# Patient Record
Sex: FEMALE | Race: BLACK OR AFRICAN AMERICAN | NOT HISPANIC OR LATINO | Employment: OTHER | URBAN - METROPOLITAN AREA
[De-identification: names, ages, dates, MRNs, and addresses within clinical notes are randomized per-mention and may not be internally consistent; named-entity substitution may affect disease eponyms.]

---

## 2017-01-09 ENCOUNTER — OFFICE VISIT (OUTPATIENT)
Dept: OPHTHALMOLOGY | Facility: CLINIC | Age: 82
End: 2017-01-09
Payer: MEDICARE

## 2017-01-09 DIAGNOSIS — H02.139 SENILE ECTROPION, UNSPECIFIED LATERALITY: ICD-10-CM

## 2017-01-09 DIAGNOSIS — H01.003 BLEPHARITIS OF BOTH EYES, UNSPECIFIED EYELID, UNSPECIFIED TYPE: Primary | ICD-10-CM

## 2017-01-09 DIAGNOSIS — H04.123 INSUFFICIENCY OF TEAR FILM OF BOTH EYES: ICD-10-CM

## 2017-01-09 DIAGNOSIS — H04.123 DRY EYE SYNDROME, BILATERAL: ICD-10-CM

## 2017-01-09 DIAGNOSIS — H40.1133 PRIMARY OPEN ANGLE GLAUCOMA OF BOTH EYES, SEVERE STAGE: Primary | ICD-10-CM

## 2017-01-09 DIAGNOSIS — H01.006 BLEPHARITIS OF BOTH EYES, UNSPECIFIED EYELID, UNSPECIFIED TYPE: Primary | ICD-10-CM

## 2017-01-09 DIAGNOSIS — Z96.1 PSEUDOPHAKIA: ICD-10-CM

## 2017-01-09 PROBLEM — H02.109: Status: ACTIVE | Noted: 2017-01-09

## 2017-01-09 PROCEDURE — 99999 PR PBB SHADOW E&M-EST. PATIENT-LVL II: CPT | Mod: PBBFAC,,, | Performed by: OPHTHALMOLOGY

## 2017-01-09 PROCEDURE — 99212 OFFICE O/P EST SF 10 MIN: CPT | Mod: PBBFAC | Performed by: OPHTHALMOLOGY

## 2017-01-09 PROCEDURE — 92014 COMPRE OPH EXAM EST PT 1/>: CPT | Mod: GW,S$PBB,, | Performed by: OPHTHALMOLOGY

## 2017-01-09 RX ORDER — TEMAZEPAM 15 MG/1
CAPSULE ORAL
Refills: 0 | COMMUNITY
Start: 2017-01-05

## 2017-01-09 RX ORDER — ERYTHROMYCIN 5 MG/G
1 OINTMENT OPHTHALMIC NIGHTLY
Qty: 1 G | Refills: 4 | Status: SHIPPED | OUTPATIENT
Start: 2017-01-09

## 2017-01-09 RX ORDER — HYDROCODONE BITARTRATE AND ACETAMINOPHEN 5; 325 MG/1; MG/1
1 TABLET ORAL 3 TIMES DAILY PRN
Refills: 0 | COMMUNITY
Start: 2017-01-06

## 2017-01-09 RX ORDER — METRONIDAZOLE 500 MG/1
500 TABLET ORAL 2 TIMES DAILY
Refills: 0 | COMMUNITY
Start: 2016-10-14 | End: 2017-02-03

## 2017-01-09 RX ORDER — CEPHALEXIN 250 MG/1
CAPSULE ORAL
Refills: 0 | COMMUNITY
Start: 2016-10-14 | End: 2017-02-03 | Stop reason: ALTCHOICE

## 2017-01-09 RX ORDER — ERYTHROMYCIN 5 MG/G
1 OINTMENT OPHTHALMIC NIGHTLY
COMMUNITY
End: 2017-01-09 | Stop reason: SDUPTHER

## 2017-01-09 RX ORDER — CIPROFLOXACIN 500 MG/1
TABLET ORAL
Refills: 0 | COMMUNITY
Start: 2016-11-22 | End: 2017-02-03 | Stop reason: ALTCHOICE

## 2017-01-09 RX ORDER — QUETIAPINE FUMARATE 25 MG/1
TABLET, FILM COATED ORAL
Refills: 0 | COMMUNITY
Start: 2017-01-05

## 2017-01-09 RX ORDER — POTASSIUM CHLORIDE 750 MG/1
10 TABLET, EXTENDED RELEASE ORAL DAILY
Refills: 0 | COMMUNITY
Start: 2016-12-23 | End: 2017-02-03

## 2017-01-09 RX ORDER — FUROSEMIDE 40 MG/1
40 TABLET ORAL DAILY
Refills: 0 | COMMUNITY
Start: 2016-12-23 | End: 2017-02-03 | Stop reason: SDUPTHER

## 2017-01-09 RX ORDER — DOCUSATE SODIUM 50MG AND SENNOSIDES 8.6MG 8.6; 5 MG/1; MG/1
TABLET, FILM COATED ORAL
Refills: 0 | COMMUNITY
Start: 2016-12-05 | End: 2017-02-03

## 2017-01-09 NOTE — PROGRESS NOTES
HPI     Concerns About Ocular Health    Additional comments: 1 yr chk           Comments   Patient states that vision seems about the same as last visit in both   eyes.       Last edited by Piter Parekh on 1/9/2017  9:55 AM. (History)            Assessment /Plan     For exam results, see Encounter Report.    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  Also discussed options utilizing medications.    Refresh PM  Genteal Gel        ACIOL OD  PCIOL OS  quiet       Plan  Family wishes to fu with LV   RTC 6 months IOP & DFE--> consider restarting South q day  Sooner prn with family understanding

## 2017-01-09 NOTE — MR AVS SNAPSHOT
[facility] - Ophthalmology  [address]  [facility] [address]  Phone: [phone]  Fax: [phone]                  Veronika Cabrera   2017 9:30 AM   Office Visit    Description:  Female : 1920   Provider:  Deepak Herndon MD   Department:  [facility] - Ophthalmology           Reason for Visit     Concerns About Ocular Health           Diagnoses this Visit        Comments    Primary open angle glaucoma of both eyes, severe stage    -  Primary     Pseudophakia         Insufficiency of tear film of both eyes         Senile ectropion, unspecified laterality         Dry eye syndrome, bilateral                To Do List           Future Appointments        Provider Department Dept Phone    2017 2:00 PM Alessio Staples, OD [place] - [facility] [phone]    2017 3:00 PM Alessio Staples, OD [place] [facility] [phone]      Goals (5 Years of Data)     None      Follow-Up and Disposition     Return in about 6 months (around 2017), or if symptoms worsen or fail to improve, for Pressure check.      [facility] On Call     [facility] On Call Nurse Care Line -  Assistance  Registered nurses in the [facility] On Call Center provide clinical advisement, health education, appointment booking, and other advisory services.  Call for this free service at [phone].             Medications           Message regarding Medications     Verify the changes and/or additions to your medication regime listed below are the same as discussed with your clinician today.  If any of these changes or additions are incorrect, please notify your healthcare provider.             Verify that the below list of medications is an accurate representation of the medications you are currently taking.  If none reported, the list may be blank. If incorrect, please contact your healthcare provider. Carry this list with you in case of emergency.           Current Medications     acetaminophen (TYLENOL)  325 MG tablet Take 2 tablets (650 mg total) by mouth every 6 (six) hours as needed.    atorvastatin (LIPITOR) 40 MG tablet TAKE 1 TABLET BY MOUTH EVERY DAY.    cephALEXin (KEFLEX) 250 MG capsule TAKE 1 TABLET BY MOUTH TWICE DAILY FOR 7 day    ciprofloxacin HCl (CIPRO) 500 MG tablet TAKE 1 TABLET BY MOUTH EVERY DAY FOR 10 days    furosemide (LASIX) 20 MG tablet TAKE 2 TABLETS BY MOUTH EVERY MORNING AND 1 TABLET BY MOUTH IN THE MIDDLE OF THE DAY    furosemide (LASIX) 20 MG tablet TAKE 2 TABLETS BY MOUTH EVERY MORNING AND 1 TABLET IN THE MIDDLE OF THE DAY    furosemide (LASIX) 40 MG tablet Take 40 mg by mouth once daily.    hydrALAZINE (APRESOLINE) 25 MG tablet TAKE 1 TABLET BY MOUTH TWICE DAILY    hydrocodone-acetaminophen 5-325mg (NORCO) 5-325 mg per tablet Take 1 tablet by mouth 3 (three) times daily as needed.    lisinopril (PRINIVIL,ZESTRIL) 40 MG tablet TAKE 1 TABLET BY MOUTH EVERY DAY    metronidazole (FLAGYL) 500 MG tablet Take 500 mg by mouth 2 (two) times daily.    neomycin-bacitracin-polymyxin (POLYSPORIN) ophthalmic ointment Place into both eyes 4 (four) times daily.     potassium chloride (KLOR-CON) 10 MEQ TbSR Take 10 mEq by mouth once daily.    quetiapine (SEROQUEL) 25 MG Tab TAKE 1 TABLET BY MOUTH EVERY 8 HOURS AS NEEDED FOR AGITATION    SENEXON-S 8.6-50 mg per tablet TAKE 1 TABLET BY MOUTH EVERY 6 HOURS as needed FOR CONSTIPATION    temazepam (RESTORIL) 15 mg Cap TAKE 1 CAPSULE BY MOUTH AT BEDTIME AS NEEDED FOR sleep    erythromycin (ROMYCIN) ophthalmic ointment Place 1 inch into both eyes every evening. Use the first week of every month.           Clinical Reference Information           Allergies as of 1/9/2017     No Known Allergies      Immunizations Administered on Date of Encounter - 1/9/2017     None

## 2017-02-03 ENCOUNTER — INITIAL CONSULT (OUTPATIENT)
Dept: OPTOMETRY | Facility: CLINIC | Age: 82
End: 2017-02-03
Payer: MEDICARE

## 2017-02-03 ENCOUNTER — OFFICE VISIT (OUTPATIENT)
Dept: OPTOMETRY | Facility: CLINIC | Age: 82
End: 2017-02-03
Payer: MEDICARE

## 2017-02-03 DIAGNOSIS — H52.13 MYOPIA WITH ASTIGMATISM AND PRESBYOPIA, BILATERAL: Primary | ICD-10-CM

## 2017-02-03 DIAGNOSIS — H52.4 MYOPIA WITH ASTIGMATISM AND PRESBYOPIA, BILATERAL: Primary | ICD-10-CM

## 2017-02-03 DIAGNOSIS — H52.203 MYOPIA WITH ASTIGMATISM AND PRESBYOPIA, BILATERAL: Primary | ICD-10-CM

## 2017-02-03 PROCEDURE — 99999 PR PBB SHADOW E&M-EST. PATIENT-LVL II: CPT | Mod: PBBFAC,,, | Performed by: OPTOMETRIST

## 2017-02-03 PROCEDURE — 92015 DETERMINE REFRACTIVE STATE: CPT | Mod: ,,, | Performed by: OPTOMETRIST

## 2017-02-03 PROCEDURE — 99499 UNLISTED E&M SERVICE: CPT | Mod: S$PBB,,, | Performed by: OPTOMETRIST

## 2017-02-03 PROCEDURE — 99212 OFFICE O/P EST SF 10 MIN: CPT | Mod: PBBFAC,27,PO | Performed by: OPTOMETRIST

## 2017-02-03 NOTE — LETTER
February 3, 2017      Deepak Herndon MD  [address] [facility] [address]           [place] - Optometry  [address] [facility] [address] [facility]  [place] [address]  Phone: [phone]  Fax: [phone]          Patient: Veronika Cabrera   MR Number: [id number]   YOB: 1920   Date of Visit: 2/3/2017       Dear Dr. Deepak Herndon:    Thank you for referring Veronika Cabrera to me for evaluation. Attached you will find relevant portions of my assessment and plan of care.    If you have questions, please do not hesitate to call me. I look forward to following Veronika Cabrera along with you.    Sincerely,    Alessio Staples, OD    Enclosure  CC:  No Recipients    If you would like to receive this communication electronically, please contact [email or web address] or [phone] to request more information on [email or web address] Link access.    For providers and/or their staff who would like to refer a patient to [facility], please contact us through our one-stop-shop provider referral line, [facility], at [phone].    If you feel you have received this communication in error or would no longer like to receive these types of communications, please e-mail [email or web address]

## 2017-02-03 NOTE — PROGRESS NOTES
Assessment /Plan     For exam results, see Encounter Report.    Myopia with astigmatism and presbyopia, bilateral      1. See other exam low vision.

## 2017-02-03 NOTE — PROGRESS NOTES
HPI     Low Vision    Additional comments: Consult per Dr Herndon           Comments   DLS: 1/9/17 -   Pt mainly watches TV, doesn't always wear glasses  No reading, may look at pics  EES syd 1 x weekly x 1 month OU  Systane BID OU  Refresh pm QHS OU    1. POAG (primary open-angle glaucoma) - Both Eyes     2. Pseudophakia - Both Eyes   3. Ectropion - artificial tears prn   4. ACIOL OD & PCIOL OS - quiet          Last edited by Alessio Staples, OD on 2/3/2017 11:04 AM. (History)        ROS     Negative for: Constitutional, Gastrointestinal, Neurological, Skin,   Genitourinary, Musculoskeletal, HENT, Endocrine, Cardiovascular, Eyes,   Respiratory, Psychiatric, Allergic/Imm, Heme/Lymph    Last edited by Alessio Staples, OD on 2/3/2017  9:49 AM. (History)        Assessment /Plan     For exam results, see Encounter Report.    Myopia with astigmatism and presbyopia, bilateral      1. Spec Rx given. Different lens options discussed with patient. RTC 1 year refraction.

## 2017-02-03 NOTE — LETTER
February 3, 2017      Deepak Herndon MD  [address] [facility] [address]           [place] - Optometry  [address] [address]  [place] [address]  Phone: [phone]  Fax: [phone]          Patient: Veronika Cabrera   MR Number: [id number]   YOB: 1920   Date of Visit: 2/3/2017       Dear Dr. Deepak Herndon:    Thank you for referring Veronika Cabrera to me for evaluation. Attached you will find relevant portions of my assessment and plan of care.    If you have questions, please do not hesitate to call me. I look forward to following Veronika Cabrera along with you.    Sincerely,    Alessio Staples, OD    Enclosure  CC:  No Recipients    If you would like to receive this communication electronically, please contact [email or web address] or [phone] to request more information on [employer] Link access.    For providers and/or their staff who would like to refer a patient to [facility], please contact us through our one-stop-shop provider referral line, [facility], at [phone].    If you feel you have received this communication in error or would no longer like to receive these types of communications, please e-mail [email or web address]

## 2017-08-24 ENCOUNTER — HOSPITAL ENCOUNTER (EMERGENCY)
Facility: OTHER | Age: 82
Discharge: HOSPICE | End: 2017-08-24
Attending: EMERGENCY MEDICINE
Payer: MEDICARE

## 2017-08-24 VITALS
DIASTOLIC BLOOD PRESSURE: 95 MMHG | OXYGEN SATURATION: 97 % | TEMPERATURE: 99 F | HEART RATE: 76 BPM | RESPIRATION RATE: 20 BRPM | SYSTOLIC BLOOD PRESSURE: 179 MMHG

## 2017-08-24 DIAGNOSIS — R40.4 TRANSIENT ALTERATION OF AWARENESS: Primary | ICD-10-CM

## 2017-08-24 DIAGNOSIS — R05.9 COUGH: ICD-10-CM

## 2017-08-24 DIAGNOSIS — Z51.5 HOSPICE CARE PATIENT: ICD-10-CM

## 2017-08-24 LAB
ALBUMIN SERPL BCP-MCNC: 3.2 G/DL
ALP SERPL-CCNC: 264 U/L
ALT SERPL W/O P-5'-P-CCNC: 18 U/L
ANION GAP SERPL CALC-SCNC: 12 MMOL/L
AST SERPL-CCNC: 26 U/L
BASOPHILS # BLD AUTO: 0 K/UL
BASOPHILS NFR BLD: 0 %
BILIRUB SERPL-MCNC: 0.8 MG/DL
BILIRUB UR QL STRIP: NEGATIVE
BNP SERPL-MCNC: 599 PG/ML
BUN SERPL-MCNC: 15 MG/DL
CALCIUM SERPL-MCNC: 9.8 MG/DL
CHLORIDE SERPL-SCNC: 111 MMOL/L
CLARITY UR: CLEAR
CO2 SERPL-SCNC: 25 MMOL/L
COLOR UR: YELLOW
CREAT SERPL-MCNC: 0.9 MG/DL
DIFFERENTIAL METHOD: ABNORMAL
EOSINOPHIL # BLD AUTO: 0 K/UL
EOSINOPHIL NFR BLD: 0.5 %
ERYTHROCYTE [DISTWIDTH] IN BLOOD BY AUTOMATED COUNT: 14.5 %
EST. GFR  (AFRICAN AMERICAN): >60 ML/MIN/1.73 M^2
EST. GFR  (NON AFRICAN AMERICAN): 54 ML/MIN/1.73 M^2
GLUCOSE SERPL-MCNC: 65 MG/DL
GLUCOSE UR QL STRIP: NEGATIVE
HCT VFR BLD AUTO: 44.9 %
HGB BLD-MCNC: 14.8 G/DL
HGB UR QL STRIP: ABNORMAL
KETONES UR QL STRIP: ABNORMAL
LEUKOCYTE ESTERASE UR QL STRIP: NEGATIVE
LYMPHOCYTES # BLD AUTO: 1.2 K/UL
LYMPHOCYTES NFR BLD: 28.2 %
MCH RBC QN AUTO: 29.7 PG
MCHC RBC AUTO-ENTMCNC: 33 G/DL
MCV RBC AUTO: 90 FL
MONOCYTES # BLD AUTO: 0.2 K/UL
MONOCYTES NFR BLD: 4.9 %
NEUTROPHILS # BLD AUTO: 2.7 K/UL
NEUTROPHILS NFR BLD: 66.4 %
NITRITE UR QL STRIP: NEGATIVE
PH UR STRIP: 8 [PH] (ref 5–8)
PLATELET # BLD AUTO: 40 K/UL
PLATELET BLD QL SMEAR: ABNORMAL
PMV BLD AUTO: 11.6 FL
POTASSIUM SERPL-SCNC: 4.1 MMOL/L
PROT SERPL-MCNC: 8 G/DL
PROT UR QL STRIP: NEGATIVE
RBC # BLD AUTO: 4.99 M/UL
SODIUM SERPL-SCNC: 148 MMOL/L
SP GR UR STRIP: 1.01 (ref 1–1.03)
URN SPEC COLLECT METH UR: ABNORMAL
UROBILINOGEN UR STRIP-ACNC: 1 EU/DL
WBC # BLD AUTO: 4.08 K/UL

## 2017-08-24 PROCEDURE — 81003 URINALYSIS AUTO W/O SCOPE: CPT

## 2017-08-24 PROCEDURE — 80053 COMPREHEN METABOLIC PANEL: CPT

## 2017-08-24 PROCEDURE — 85025 COMPLETE CBC W/AUTO DIFF WBC: CPT

## 2017-08-24 PROCEDURE — P9612 CATHETERIZE FOR URINE SPEC: HCPCS

## 2017-08-24 PROCEDURE — 99283 EMERGENCY DEPT VISIT LOW MDM: CPT | Mod: 25

## 2017-08-24 PROCEDURE — 83880 ASSAY OF NATRIURETIC PEPTIDE: CPT

## 2017-08-24 RX ORDER — DOXYCYCLINE 100 MG/1
100 CAPSULE ORAL DAILY
COMMUNITY

## 2017-08-24 NOTE — ED NOTES
Bed: 09  Expected date:   Expected time:   Means of arrival:   Comments:  89 y/o weakness for 2 weeks

## 2017-08-24 NOTE — ED NOTES
Pt to ED via EMS with family c/o AMS x 2 hours. As per her son, the patient was unresponsive this morning and moaning-pt nonverbal at baseline per family but usually nods in response.  EMS reports had a low CBG this morning. Upon arrival to the ED, Pt son reports pt more responsive than she was this morning. Pt with hx of fatigue, tremors x 1 month, decreased appetite x 2 weeks, cough, generalized weakness, bilateral ankle and foot edema. Pt currently drooling which is new per family. Family reports pt took medication this morning. Per pt son, pt with no fever, chills, SOB, CP, N/V/D, or abdominal pain. Pt AAOx4 and appropriate at this time. Respirations even and unlabored. No acute distress noted. Awaiting further orders. Pt updated on POC. Bed is locked and in lowest position with side rails up x2. Call bell within reach and pt oriented to use of call bell. Pt on continuous cardiac monitoring, continuous pulse ox, and continuous BP cuff. Will continue to monitor.

## 2017-08-24 NOTE — ED NOTES
Patient identifiers verified and correct for Veronika Cabrera.    LOC: The patient is awake, alert, the patient is moaning- nonverbal at baseline per family   APPEARANCE: Patient resting comfortably and in no acute distress, patient is clean and well groomed, patient's clothing is properly fastened.  SKIN: The skin is warm and dry, color consistent with ethnicity, patient has normal skin turgor and moist mucus membranes, skin intact, no breakdown or bruising noted, redness noted to bilateral lower eyelids  MUSCULOSKELETAL: Patient moving all extremities spontaneously, no obvious swelling or deformities noted.  RESPIRATORY: Airway is open and patent, respirations are spontaneous, patient has a low effort and rate, no accessory muscle use noted, bilateral breath sounds clear and equal.  CARDIAC: Patient has a normal rate and regular rhythm, 2+ edema to bilateral ankles and feet, capillary refill < 3 seconds.  ABDOMEN: Soft and non tender to palpation, no distention noted.  NEUROLOGIC: Right pupil non reactive, eyes open spontaneously, follows commands, facial expression symmetrical, purposeful motor response noted, tremors present